# Patient Record
(demographics unavailable — no encounter records)

---

## 2025-01-07 NOTE — HISTORY OF PRESENT ILLNESS
[FreeTextEntry1] : IDANIA is a 5 year old female here for initial evaluation of inattention.   Early development: IDANIA was born full term via NVD. she was discharged home with mother. Evaluated through EI, dg w/ sensory processing disorder. Denied services.  IDANIA attended regular pre-school at age 4.     Educational assessment: Current Grade: .  Current District: [facility]) General ED/Any Accommodations/ICT in place: Currently in a regular classroom setting without accommodations. While their content knowledge is on grade level, IDANIA mainly struggles with work completion. Teachers have expressed concern for the second consecutive year regarding the Tavia inattention and difficulty focusing and completing assignments. This difficulty manifests in various tasks, from simple coloring to academic work. Because of this, misses rest periods to try and catch up on unfinished work. Started reading.    Home assessment: Shares time w/ mother and father. Positive relationship. They maintain a structured home environment, utilizing tools like reminder boards to help the patient track tasks and visually keren their completion. Inattention: Very easily distracted, needing frequent redirection, difficulty with multi-step instructions, even 1 step instructions.  Hyperactivity: Fidgety Organization: Denies difficulty staying organized Occasional OCD. Needs to put colors in a row when done coloring.  In additoin, mother reports increased urinary accidents and frequency, possibly related to ADHD. Needs to be reminded to used the bathroom, sometimes forgets.   Social Concerns: -  Sleep: sleeps well Eating: eats a varied diet + Sensory processing + scractcing to back.    Co- morbidities: No concern for anxiety, depression  Other health concerns: Denies staring, twitching, seizure or seizure-like activity.   ACTIVITIES/INTERESTS: Enjoys coloring, puzzles and playing w/ American girl dolls.  Used to do gymnastics. Recently started [employer] program.    FAMILY HISTORY: Family history of ADHD: denies Family history of anxiety or depression: denies Denies family history of cardiac conditions or early unexplained deaths.

## 2025-01-07 NOTE — PHYSICAL EXAM
[Well-appearing] : well-appearing [Normocephalic] : normocephalic [No dysmorphic facial features] : no dysmorphic facial features [No deformities] : no deformities [Alert] : alert [Well related, good eye contact] : well related, good eye contact [Conversant] : conversant [Normal speech and language] : normal speech and language [Follows instructions well] : follows instructions well [Full extraocular movements] : full extraocular movements [No nystagmus] : no nystagmus [Normal facial sensation to light touch] : normal facial sensation to light touch [No facial asymmetry or weakness] : no facial asymmetry or weakness [Gross hearing intact] : gross hearing intact [Equal palate elevation] : equal palate elevation [Good shoulder shrug] : good shoulder shrug [Normal tongue movement] : normal tongue movement [Gets up on table without difficulty] : gets up on table without difficulty [No abnormal involuntary movements] : no abnormal involuntary movements [Walks and runs well] : walks and runs well [Able to walk on heels] : able to walk on heels [Able to walk on toes] : able to walk on toes [Good walking balance] : good walking balance [Normal gait] : normal gait [de-identified] : No respiratory distress noted.

## 2025-01-07 NOTE — ASSESSMENT
[FreeTextEntry1] : IDANIA is a 5-year-old female, presented for an initial evaluation of inattention. Currently in  in a regular classroom setting without accommodations. Teachers have reported concerns regarding inattention, difficulty focusing, and incomplete work for the past two years. She frequently misses rest periods to catch up on schoolwork. Review of [place] forms completed by both parents and her teacher confirms a diagnosis of ADHD, predominantly inattentive type. Non focal exam.

## 2025-01-07 NOTE — PLAN
[FreeTextEntry1] : - Parent and Teacher [place] forms reviewed, + ADHD, predominately inattentive type.  - Letter for school accommodations provided to parent. Recommend full psychoeducational evaluation through district -  Discussed use of Omega 3 fish oil - Discussed use of medications as well as side effects if accommodations do not improve school performance - Follow up before end of school year to assess progress. Call sooner if any concerns. Consider start of stimulant for upcoming school year.

## 2025-01-07 NOTE — DATA REVIEWED
syncope
syncope
[FreeTextEntry1] : [place] forms reviewed:   Parents responses:  + Inattention 6/9- (6/9).  Hyperactivity 4/9 (6/9)  ODD: 1/8. (4/8) 19-26  Conduct disorder: 0/14 (3/14) 27-40  Anxiety/ Depression: 0/7 (3/7) Overall performance: struggles w/ overall school performance, math, and writing.    Teachers responses: + Inattention 7/9- (6/9).  Hyperactivity 1/9 (6/9)  ODD/ Conduct: 0/10. (3/10) 19-28  Anxiety/ Depression:  0/7 (3/7)  Overall Performance: struggles w/ following directions, assignment completion and organizational skills.   
syncope

## 2025-05-28 NOTE — REASON FOR VISIT
[Home] : at home, [unfilled] , at the time of the visit. [Medical Office: ([facility])___] : at the medical office located in  [Telehealth (audio & video)] : This visit was provided via telehealth using real-time 2-way audio visual technology. [Verbal consent obtained from patient] : the patient, [unfilled] [Follow-Up Evaluation] : a follow-up evaluation for [ADHD] : ADHD [Patient] : patient [Mother] : mother [Medical Records] : medical records

## 2025-05-29 NOTE — PHYSICAL EXAM
[Well-appearing] : well-appearing [Normocephalic] : normocephalic [No dysmorphic facial features] : no dysmorphic facial features [No deformities] : no deformities [Alert] : alert [Well related, good eye contact] : well related, good eye contact [Conversant] : conversant [Normal speech and language] : normal speech and language [Follows instructions well] : follows instructions well [Full extraocular movements] : full extraocular movements [No nystagmus] : no nystagmus [Normal facial sensation to light touch] : normal facial sensation to light touch [No facial asymmetry or weakness] : no facial asymmetry or weakness [Gross hearing intact] : gross hearing intact [Equal palate elevation] : equal palate elevation [Good shoulder shrug] : good shoulder shrug [Normal tongue movement] : normal tongue movement [Gets up on table without difficulty] : gets up on table without difficulty [No abnormal involuntary movements] : no abnormal involuntary movements [Walks and runs well] : walks and runs well [Able to walk on heels] : able to walk on heels [Able to walk on toes] : able to walk on toes [Good walking balance] : good walking balance [Normal gait] : normal gait [de-identified] : No respiratory distress noted.

## 2025-05-29 NOTE — ASSESSMENT
[FreeTextEntry1] : IDANIA is a 5-year-old female, presented for follow-up regarding ADHD, inattentive type. She is currently in  in a regular classroom setting. Since her last visit, she has been evaluated by the school district and approved for an IEP, including speech and language services. She continues taking omega-3 supplements, with reported improvement in attention, corroborated by teacher reports of increased focus. No new concerns were reported.  Discussed stimulant medication as a first-line treatment for ADHD, including potential side effects, and the process of finding the right medication and dosage through trial and error. Given MAURISIO upcoming 6th birthday in August, a trial of stimulant medication over the summer was considered to assess her response. Alternatively, given her recent progress and improvement, deferring a medication trial until October was also discussed. Repeating [place] forms at that time to reassess the severity of her inattention and guide medication decisions was suggested. Mother verbalized understanding, and all questions were answered.

## 2025-05-29 NOTE — DATA REVIEWED
[FreeTextEntry1] : [place] forms reviewed:   Parents responses:  + Inattention 6/9- (6/9).  Hyperactivity 4/9 (6/9)  ODD: 1/8. (4/8) 19-26  Conduct disorder: 0/14 (3/14) 27-40  Anxiety/ Depression: 0/7 (3/7) Overall performance: struggles w/ overall school performance, math, and writing.    Teachers responses: + Inattention 7/9- (6/9).  Hyperactivity 1/9 (6/9)  ODD/ Conduct: 0/10. (3/10) 19-28  Anxiety/ Depression:  0/7 (3/7)  Overall Performance: struggles w/ following directions, assignment completion and organizational skills.

## 2025-05-29 NOTE — PLAN
[FreeTextEntry1] : - Mother to send copy of recent psychoeducational evaluation along w/ IEP.  -  Continue Omega 3 fish oil - Discussed use of medications as well as side effects if accommodations do not improve school performance. Consider repeat [place] forms x October to assess progress. Mother may still want to trial stimulant x end of August before start of new school year.  - Follow up 5-6 months. Call sooner if any concerns.

## 2025-05-29 NOTE — HISTORY OF PRESENT ILLNESS
[FreeTextEntry1] : IDANIA is a 5-year-old female here for f/u evaluation of ADHD, inattentive type.  Currently in  in a regular classroom setting without accommodations.  f/u 05/29/2025  Since last visit, evaluated by district. Approved for IEP classification, speech and language. Rec's ST 1x/week in school and private ST 2x/week. Also have a 504 in place which will continue for next year as well.  Continues to take Omega 3 supplements w/ noted improvement in attention. Teachers have also expressed that IDANIA is more focused.  Will be attending camp over the summer along w/ different activities.  No new concerns.   Early development: IDANIA was born full term via NVD. she was discharged home with mother. Evaluated through EI, dg w/ sensory processing disorder. Denied services.  IDANIA attended regular pre-school at age 4.     Educational assessment: Current Grade: .  Current District: [facility]) General ED/Any Accommodations/ICT in place: Currently in a regular classroom setting without accommodations. While their content knowledge is on grade level, IDANIA mainly struggles with work completion. Teachers have expressed concern for the second consecutive year regarding the Tavia inattention and difficulty focusing and completing assignments. This difficulty manifests in various tasks, from simple coloring to academic work. Because of this, misses rest periods to try and catch up on unfinished work. Started reading.    Home assessment: Shares time w/ mother and father. Positive relationship. They maintain a structured home environment, utilizing tools like reminder boards to help the patient track tasks and visually keren their completion. Inattention: Very easily distracted, needing frequent redirection, difficulty with multi-step instructions, even 1 step instructions.  Hyperactivity: Fidgety Organization: Denies difficulty staying organized Occasional OCD. Needs to put colors in a row when done coloring.  In additoin, mother reports increased urinary accidents and frequency, possibly related to ADHD. Needs to be reminded to used the bathroom, sometimes forgets.   Social Concerns: -  Sleep: sleeps well Eating: eats a varied diet + Sensory processing + scractcing to back.    Co- morbidities: No concern for anxiety, depression  Other health concerns: Denies staring, twitching, seizure or seizure-like activity.   ACTIVITIES/INTERESTS: Enjoys coloring, puzzles and playing w/ American girl dolls.  Used to do gymnastics. Recently started [employer] program.    FAMILY HISTORY: Family history of ADHD: denies Family history of anxiety or depression: denies Denies family history of cardiac conditions or early unexplained deaths.